# Patient Record
Sex: MALE | Race: ASIAN | NOT HISPANIC OR LATINO | URBAN - METROPOLITAN AREA
[De-identification: names, ages, dates, MRNs, and addresses within clinical notes are randomized per-mention and may not be internally consistent; named-entity substitution may affect disease eponyms.]

---

## 2022-12-30 PROBLEM — Z00.00 ENCOUNTER FOR ANNUAL PHYSICAL EXAM: Status: ACTIVE | Noted: 2022-12-30

## 2024-03-14 ENCOUNTER — OFFICE VISIT (OUTPATIENT)
Dept: FAMILY MEDICINE CLINIC | Facility: CLINIC | Age: 39
End: 2024-03-14
Payer: COMMERCIAL

## 2024-03-14 VITALS
WEIGHT: 183 LBS | BODY MASS INDEX: 26.2 KG/M2 | SYSTOLIC BLOOD PRESSURE: 118 MMHG | HEART RATE: 75 BPM | DIASTOLIC BLOOD PRESSURE: 76 MMHG | OXYGEN SATURATION: 97 % | TEMPERATURE: 97.4 F | HEIGHT: 70 IN

## 2024-03-14 DIAGNOSIS — M25.561 CHRONIC PAIN OF RIGHT KNEE: Primary | ICD-10-CM

## 2024-03-14 DIAGNOSIS — G89.29 CHRONIC PAIN OF RIGHT KNEE: Primary | ICD-10-CM

## 2024-03-14 PROCEDURE — 99213 OFFICE O/P EST LOW 20 MIN: CPT | Performed by: FAMILY MEDICINE

## 2024-03-14 NOTE — PROGRESS NOTES
Depression Screening and Follow-up Plan: Patient's depression screening was positive with a PHQ-2 score of 3. Their PHQ-9 score was 5. Patient assessed for underlying major depression. Brief counseling provided and recommend additional follow-up/re-evaluation next office visit.     Assessment/Plan:         Problem List Items Addressed This Visit        Surgery/Wound/Pain    Chronic pain of right knee - Primary     Patient has had it for past year and not getting better. Started after squatting with weights. Was swollen at first. Now, it hurts to extend leg and to run or go down stairs. Patient has tenderness to palpation right lateral knee and likely has meniscus injury. Will refer to Orthopedics. Patient to take advil as needed and to avoid excessive strain on knee. Patient to wear knee support and will likely need physical therapy.          Relevant Orders    Ambulatory Referral to Orthopedic Surgery         Subjective:      Patient ID: Rossy Driver is a 38 y.o. male.    Patient here for right knee pain off and on for past 1 year. Started after doing squats at home. Was swollen at first. Gets worse when runs. Feels pressure around patella when kneels. No giving out. No pain with walking. Hurts going down stairs. No previous injuries to knee. Not getting better. Not taking any medications or using brace.     Knee Pain         The following portions of the patient's history were reviewed and updated as appropriate:   Past Medical History:  He has a past medical history of Allergic.,  _______________________________________________________________________  Medical Problems:  does not have any pertinent problems on file.,  _______________________________________________________________________  Past Surgical History:   has no past surgical history on file.,  _______________________________________________________________________  Family History:  family history includes Heart disease in his father; Hyperlipidemia  "in his father and mother; Hypertension in his father and mother.,  _______________________________________________________________________  Social History:   reports that he has never smoked. He has never used smokeless tobacco. He reports that he does not drink alcohol and does not use drugs.,  _______________________________________________________________________  Allergies:  has No Known Allergies..  _______________________________________________________________________  No current outpatient medications on file.     No current facility-administered medications for this visit.     _______________________________________________________________________  Review of Systems   Constitutional:  Negative for fatigue and unexpected weight change.   Respiratory:  Negative for cough and shortness of breath.    Cardiovascular:  Negative for chest pain.   Gastrointestinal:  Negative for abdominal pain, constipation, diarrhea and vomiting.   Musculoskeletal:  Positive for arthralgias.   Neurological:  Negative for dizziness and headaches.   Psychiatric/Behavioral:  Negative for dysphoric mood. The patient is not nervous/anxious.          Objective:  Vitals:    03/14/24 0936 03/14/24 0937   BP:  118/76   BP Location:  Left arm   Patient Position:  Sitting   Cuff Size:  Standard   Pulse:  75   Temp:  (!) 97.4 °F (36.3 °C)   TempSrc:  Tympanic   SpO2:  97%   Weight: 82.6 kg (182 lb) 83 kg (183 lb)   Height:  5' 10\" (1.778 m)     Body mass index is 26.26 kg/m².     Physical Exam  Vitals and nursing note reviewed.   Constitutional:       Appearance: Normal appearance. He is well-developed and normal weight.   Neck:      Thyroid: No thyromegaly.   Cardiovascular:      Rate and Rhythm: Normal rate and regular rhythm.      Heart sounds: Normal heart sounds. No murmur heard.  Pulmonary:      Effort: Pulmonary effort is normal. No respiratory distress.      Breath sounds: Normal breath sounds. No wheezing.   Musculoskeletal:      " Cervical back: Normal range of motion and neck supple.      Right lower leg: No edema.      Left lower leg: No edema.      Comments: Tenderness to palpation lateral to right patella and pain with leg extension, No effusions today   Lymphadenopathy:      Cervical: No cervical adenopathy.   Neurological:      Mental Status: He is alert and oriented to person, place, and time.      Cranial Nerves: No cranial nerve deficit.   Psychiatric:         Mood and Affect: Mood normal.         Behavior: Behavior normal.         Thought Content: Thought content normal.         Judgment: Judgment normal.

## 2024-03-14 NOTE — ASSESSMENT & PLAN NOTE
Patient has had it for past year and not getting better. Started after squatting with weights. Was swollen at first. Now, it hurts to extend leg and to run or go down stairs. Patient has tenderness to palpation right lateral knee and likely has meniscus injury. Will refer to Orthopedics. Patient to take advil as needed and to avoid excessive strain on knee. Patient to wear knee support and will likely need physical therapy.

## 2024-12-03 ENCOUNTER — APPOINTMENT (EMERGENCY)
Dept: CT IMAGING | Facility: HOSPITAL | Age: 39
End: 2024-12-03
Payer: COMMERCIAL

## 2024-12-03 ENCOUNTER — HOSPITAL ENCOUNTER (OUTPATIENT)
Facility: HOSPITAL | Age: 39
Setting detail: OBSERVATION
Discharge: HOME | End: 2024-12-05
Attending: EMERGENCY MEDICINE | Admitting: INTERNAL MEDICINE
Payer: COMMERCIAL

## 2024-12-03 ENCOUNTER — HOSPITAL ENCOUNTER (EMERGENCY)
Facility: HOSPITAL | Age: 39
Discharge: STILL ADMITTED OR ADMINISTRATIVE | End: 2024-12-03
Attending: EMERGENCY MEDICINE
Payer: COMMERCIAL

## 2024-12-03 VITALS
OXYGEN SATURATION: 98 % | TEMPERATURE: 99.3 F | DIASTOLIC BLOOD PRESSURE: 76 MMHG | RESPIRATION RATE: 14 BRPM | HEART RATE: 72 BPM | SYSTOLIC BLOOD PRESSURE: 118 MMHG

## 2024-12-03 DIAGNOSIS — J36 PERITONSILLAR ABSCESS: Primary | ICD-10-CM

## 2024-12-03 DIAGNOSIS — J03.90 TONSILLITIS: ICD-10-CM

## 2024-12-03 LAB
ALBUMIN SERPL BCG-MCNC: 4 G/DL (ref 3.5–5)
ALP SERPL-CCNC: 58 U/L (ref 34–104)
ALT SERPL W P-5'-P-CCNC: 36 U/L (ref 7–52)
ANION GAP SERPL CALCULATED.3IONS-SCNC: 9 MMOL/L (ref 4–13)
AST SERPL W P-5'-P-CCNC: 17 U/L (ref 13–39)
BASOPHILS # BLD AUTO: 0.07 THOUSANDS/ÂΜL (ref 0–0.1)
BASOPHILS NFR BLD AUTO: 0 % (ref 0–1)
BILIRUB SERPL-MCNC: 0.63 MG/DL (ref 0.2–1)
BUN SERPL-MCNC: 15 MG/DL (ref 5–25)
CALCIUM SERPL-MCNC: 9.4 MG/DL (ref 8.4–10.2)
CHLORIDE SERPL-SCNC: 101 MMOL/L (ref 96–108)
CO2 SERPL-SCNC: 25 MMOL/L (ref 21–32)
CREAT SERPL-MCNC: 0.85 MG/DL (ref 0.6–1.3)
EOSINOPHIL # BLD AUTO: 0.04 THOUSAND/ÂΜL (ref 0–0.61)
EOSINOPHIL NFR BLD AUTO: 0 % (ref 0–6)
ERYTHROCYTE [DISTWIDTH] IN BLOOD BY AUTOMATED COUNT: 12.2 % (ref 11.6–15.1)
GFR SERPL CREATININE-BSD FRML MDRD: 108 ML/MIN/1.73SQ M
GLUCOSE SERPL-MCNC: 101 MG/DL (ref 65–140)
HCT VFR BLD AUTO: 42.3 % (ref 36.5–49.3)
HGB BLD-MCNC: 14.1 G/DL (ref 12–17)
IMM GRANULOCYTES # BLD AUTO: 0.29 THOUSAND/UL (ref 0–0.2)
IMM GRANULOCYTES NFR BLD AUTO: 2 % (ref 0–2)
LYMPHOCYTES # BLD AUTO: 2.82 THOUSANDS/ÂΜL (ref 0.6–4.47)
LYMPHOCYTES NFR BLD AUTO: 18 % (ref 14–44)
MAGNESIUM SERPL-MCNC: 2.2 MG/DL (ref 1.9–2.7)
MCH RBC QN AUTO: 28 PG (ref 26.8–34.3)
MCHC RBC AUTO-ENTMCNC: 33.3 G/DL (ref 31.4–37.4)
MCV RBC AUTO: 84 FL (ref 82–98)
MONOCYTES # BLD AUTO: 1.35 THOUSAND/ÂΜL (ref 0.17–1.22)
MONOCYTES NFR BLD AUTO: 9 % (ref 4–12)
NEUTROPHILS # BLD AUTO: 11.4 THOUSANDS/ÂΜL (ref 1.85–7.62)
NEUTS SEG NFR BLD AUTO: 71 % (ref 43–75)
NRBC BLD AUTO-RTO: 0 /100 WBCS
PLATELET # BLD AUTO: 499 THOUSANDS/UL (ref 149–390)
PMV BLD AUTO: 8.8 FL (ref 8.9–12.7)
POTASSIUM SERPL-SCNC: 3.6 MMOL/L (ref 3.5–5.3)
PROT SERPL-MCNC: 7.7 G/DL (ref 6.4–8.4)
RBC # BLD AUTO: 5.03 MILLION/UL (ref 3.88–5.62)
SODIUM SERPL-SCNC: 135 MMOL/L (ref 135–147)
WBC # BLD AUTO: 15.97 THOUSAND/UL (ref 4.31–10.16)

## 2024-12-03 PROCEDURE — 99285 EMERGENCY DEPT VISIT HI MDM: CPT | Performed by: EMERGENCY MEDICINE

## 2024-12-03 PROCEDURE — 42700 I&D ABSCESS PERITONSILLAR: CPT | Performed by: OTOLARYNGOLOGY

## 2024-12-03 PROCEDURE — 96375 TX/PRO/DX INJ NEW DRUG ADDON: CPT

## 2024-12-03 PROCEDURE — 87040 BLOOD CULTURE FOR BACTERIA: CPT | Performed by: EMERGENCY MEDICINE

## 2024-12-03 PROCEDURE — 99282 EMERGENCY DEPT VISIT SF MDM: CPT | Performed by: OTOLARYNGOLOGY

## 2024-12-03 PROCEDURE — 83735 ASSAY OF MAGNESIUM: CPT | Performed by: EMERGENCY MEDICINE

## 2024-12-03 PROCEDURE — 99283 EMERGENCY DEPT VISIT LOW MDM: CPT

## 2024-12-03 PROCEDURE — 80053 COMPREHEN METABOLIC PANEL: CPT | Performed by: EMERGENCY MEDICINE

## 2024-12-03 PROCEDURE — 36415 COLL VENOUS BLD VENIPUNCTURE: CPT | Performed by: EMERGENCY MEDICINE

## 2024-12-03 PROCEDURE — 96365 THER/PROPH/DIAG IV INF INIT: CPT

## 2024-12-03 PROCEDURE — 87185 SC STD ENZYME DETCJ PER NZM: CPT

## 2024-12-03 PROCEDURE — 85025 COMPLETE CBC W/AUTO DIFF WBC: CPT | Performed by: EMERGENCY MEDICINE

## 2024-12-03 PROCEDURE — 87070 CULTURE OTHR SPECIMN AEROBIC: CPT

## 2024-12-03 PROCEDURE — 70491 CT SOFT TISSUE NECK W/DYE: CPT

## 2024-12-03 PROCEDURE — 87076 CULTURE ANAEROBE IDENT EACH: CPT

## 2024-12-03 PROCEDURE — 87075 CULTR BACTERIA EXCEPT BLOOD: CPT

## 2024-12-03 PROCEDURE — 96374 THER/PROPH/DIAG INJ IV PUSH: CPT

## 2024-12-03 PROCEDURE — 87077 CULTURE AEROBIC IDENTIFY: CPT

## 2024-12-03 PROCEDURE — 99223 1ST HOSP IP/OBS HIGH 75: CPT | Performed by: INTERNAL MEDICINE

## 2024-12-03 PROCEDURE — 99284 EMERGENCY DEPT VISIT MOD MDM: CPT

## 2024-12-03 PROCEDURE — 87205 SMEAR GRAM STAIN: CPT

## 2024-12-03 RX ORDER — KETOROLAC TROMETHAMINE 30 MG/ML
15 INJECTION, SOLUTION INTRAMUSCULAR; INTRAVENOUS ONCE
Status: COMPLETED | OUTPATIENT
Start: 2024-12-03 | End: 2024-12-03

## 2024-12-03 RX ORDER — CLINDAMYCIN PHOSPHATE 600 MG/50ML
600 INJECTION, SOLUTION INTRAVENOUS EVERY 8 HOURS
Status: DISCONTINUED | OUTPATIENT
Start: 2024-12-03 | End: 2024-12-05 | Stop reason: HOSPADM

## 2024-12-03 RX ORDER — KETOROLAC TROMETHAMINE 30 MG/ML
15 INJECTION, SOLUTION INTRAMUSCULAR; INTRAVENOUS ONCE
Status: DISCONTINUED | OUTPATIENT
Start: 2024-12-03 | End: 2024-12-03 | Stop reason: HOSPADM

## 2024-12-03 RX ORDER — HYDROMORPHONE HCL IN WATER/PF 6 MG/30 ML
0.2 PATIENT CONTROLLED ANALGESIA SYRINGE INTRAVENOUS ONCE
Status: COMPLETED | OUTPATIENT
Start: 2024-12-03 | End: 2024-12-03

## 2024-12-03 RX ORDER — ENOXAPARIN SODIUM 100 MG/ML
40 INJECTION SUBCUTANEOUS DAILY
Status: DISCONTINUED | OUTPATIENT
Start: 2024-12-03 | End: 2024-12-05 | Stop reason: HOSPADM

## 2024-12-03 RX ORDER — LIDOCAINE HYDROCHLORIDE AND EPINEPHRINE BITARTRATE 20; .01 MG/ML; MG/ML
1 INJECTION, SOLUTION SUBCUTANEOUS ONCE
Status: COMPLETED | OUTPATIENT
Start: 2024-12-03 | End: 2024-12-03

## 2024-12-03 RX ORDER — KETOROLAC TROMETHAMINE 30 MG/ML
15 INJECTION, SOLUTION INTRAMUSCULAR; INTRAVENOUS EVERY 6 HOURS PRN
Status: ACTIVE | OUTPATIENT
Start: 2024-12-03 | End: 2024-12-05

## 2024-12-03 RX ORDER — ACETAMINOPHEN 160 MG/5ML
650 SUSPENSION ORAL ONCE
Status: DISCONTINUED | OUTPATIENT
Start: 2024-12-03 | End: 2024-12-03

## 2024-12-03 RX ORDER — AMPICILLIN AND SULBACTAM 1; .5 G/1; G/1
INJECTION, POWDER, FOR SOLUTION INTRAMUSCULAR; INTRAVENOUS
Status: COMPLETED
Start: 2024-12-03 | End: 2024-12-03

## 2024-12-03 RX ORDER — WATER 10 ML/10ML
INJECTION INTRAMUSCULAR; INTRAVENOUS; SUBCUTANEOUS
Status: COMPLETED
Start: 2024-12-03 | End: 2024-12-03

## 2024-12-03 RX ORDER — DEXAMETHASONE SODIUM PHOSPHATE 10 MG/ML
10 INJECTION, SOLUTION INTRAMUSCULAR; INTRAVENOUS ONCE
Status: COMPLETED | OUTPATIENT
Start: 2024-12-03 | End: 2024-12-03

## 2024-12-03 RX ORDER — ACETAMINOPHEN 325 MG/1
650 TABLET ORAL EVERY 6 HOURS PRN
Status: DISCONTINUED | OUTPATIENT
Start: 2024-12-03 | End: 2024-12-05 | Stop reason: HOSPADM

## 2024-12-03 RX ADMIN — KETOROLAC TROMETHAMINE 15 MG: 30 INJECTION, SOLUTION INTRAMUSCULAR at 01:57

## 2024-12-03 RX ADMIN — CLINDAMYCIN PHOSPHATE 600 MG: 600 INJECTION, SOLUTION INTRAVENOUS at 18:09

## 2024-12-03 RX ADMIN — SODIUM CHLORIDE 3 G: 9 INJECTION, SOLUTION INTRAVENOUS at 02:19

## 2024-12-03 RX ADMIN — HYDROMORPHONE HYDROCHLORIDE 0.2 MG: 0.2 INJECTION, SOLUTION INTRAMUSCULAR; INTRAVENOUS; SUBCUTANEOUS at 08:17

## 2024-12-03 RX ADMIN — DEXAMETHASONE SODIUM PHOSPHATE 10 MG: 10 INJECTION, SOLUTION INTRAMUSCULAR; INTRAVENOUS at 01:57

## 2024-12-03 RX ADMIN — CLINDAMYCIN PHOSPHATE 600 MG: 600 INJECTION, SOLUTION INTRAVENOUS at 11:28

## 2024-12-03 RX ADMIN — SODIUM CHLORIDE 1000 ML: 0.9 INJECTION, SOLUTION INTRAVENOUS at 01:57

## 2024-12-03 RX ADMIN — IOHEXOL 100 ML: 350 INJECTION, SOLUTION INTRAVENOUS at 03:04

## 2024-12-03 RX ADMIN — TOPICAL ANESTHETIC: 200 SPRAY DENTAL; PERIODONTAL at 08:36

## 2024-12-03 RX ADMIN — LIDOCAINE HYDROCHLORIDE,EPINEPHRINE BITARTRATE 1 ML: 20; .01 INJECTION, SOLUTION INFILTRATION; PERINEURAL at 08:36

## 2024-12-03 NOTE — ED PROVIDER NOTES
Time reflects when diagnosis was documented in both MDM as applicable and the Disposition within this note       Time User Action Codes Description Comment    12/3/2024  4:32 AM Rick Elam [[email or web address]] Peritonsillar abscess     12/3/2024  4:32 AM Rick Elam [J03.90] Tonsillitis     12/3/2024  4:32 AM Tucker Charles Add [[email or web address]] Tonsillar abscess     12/3/2024  4:32 AM Tucker Charles Remove [[email or web address]] Tonsillar abscess           ED Disposition       ED Disposition   Transfer to Another Facility-In Network    Condition   --    Date/Time   Tue Dec 3, 2024  4:32 AM    Comment   Rossy Driver should be transferred out to [facility].               Assessment & Plan       Medical Decision Making  Patient is a 39-year-old male who presented to the ED for sore throat.  Patient stated that he initially started having sore throat on 11/24/2024 and was found to have strep and placed on amoxicillin.  Patient's symptoms worsened and he developed symptoms of space-occupying lesion with some difficulty with feeding as well as feeling of tightness in the throat and muffled voice.  At that point he presented to [place] and was admitted for peritonsillar abscess and tonsillitis.  CT soft tissue neck showed these findings.  He was placed on Unasyn and was seen by ENT the next day who stated that there was improvement in that he was okay to discharge home on Augmentin and steroids.  Patient continued to take these as prescribed and currently is finishing up the Augmentin and steroids.  Today, he presented for worsening sore throat as well as worsening subjective feeling of throat tightness with some subjective dyspnea.  He is saturating well on room air.  No increased work of breathing or stridor.  Is able to tolerate p.o. intake but states that solid food is very difficult for him to eat.  On evaluation, patient appeared very uncomfortable and had significantly muffled voice.    Ddx includes but is not limited to tonsillitis,  peritonsillar abscess.  CBC and CMP grossly unremarkable with exception of WBC 15.97. CT soft tissue neck revealed likely PTA measuring 2.9 x 3.3 x 5.5 cm, notably larger than previous recent study.  Patient was given Toradol and Decadron and stated that he felt much better afterwards.  Unasyn was initiated empirically.  ENT was contacted and given the mass effect from the abscess as well as the failure of current medical therapy, they recommended I&D at [place] and then admission to medicine for continued IV antibiotics.  Patient was counseled on this and accepted the plan. Remained hemodynamically stable and without signs of impending airway compromise. Patient accepted at [facility], currently pending transfer at 0730.      Amount and/or Complexity of Data Reviewed  Labs: ordered. Decision-making details documented in ED Course.  Radiology: ordered.    Risk  Prescription drug management.        ED Course as of 12/03/24 0716   Tue Dec 03, 2024   0219 WBC(!): 15.97       Medications   ketorolac (TORADOL) injection 15 mg (has no administration in time range)   sodium chloride 0.9 % bolus 1,000 mL (0 mL Intravenous Stopped 12/3/24 0329)   ketorolac (TORADOL) injection 15 mg (15 mg Intravenous Given 12/3/24 0157)   dexamethasone (PF) (DECADRON) injection 10 mg (10 mg Intravenous Given 12/3/24 0157)   ampicillin-sulbactam (UNASYN) 3 g in sodium chloride 0.9 % 100 mL IVPB (0 g Intravenous Stopped 12/3/24 0329)   ampicillin-sulbactam (UNASYN) 1.5 g injection **ADS Override Pull** (  Override Pull 12/3/24 0215)   sterile water injection **ADS Override Pull** (  Override Pull 12/3/24 0217)   iohexol (OMNIPAQUE) 350 MG/ML injection (SINGLE-DOSE) 100 mL (100 mL Intravenous Given 12/3/24 0304)       ED Risk Strat Scores                                               History of Present Illness       Chief Complaint   Patient presents with    Sore Throat - Complicated     Patient arrives to the ER from home with complaints of sore  throat and inability to eat. Pt recently placed on antibiotics and was seen for similar symptoms. Pt reports he had a CT scan completed and he was told he needed surgery. Pt has been taking tylenol and motrin at home. Recent diagnosis of peritonsillar abscess.       Past Medical History:   Diagnosis Date    Allergic       History reviewed. No pertinent surgical history.   Family History   Problem Relation Age of Onset    Hypertension Mother     Hyperlipidemia Mother     Hypertension Father     Heart disease Father     Hyperlipidemia Father       Social History     Tobacco Use    Smoking status: Never    Smokeless tobacco: Never   Vaping Use    Vaping status: Never Used   Substance Use Topics    Alcohol use: Never    Drug use: Never      E-Cigarette/Vaping    E-Cigarette Use Never User       E-Cigarette/Vaping Substances    Nicotine No     THC No     CBD No     Flavoring No     Other No     Unknown No       I have reviewed and agree with the history as documented.     Patient is a 39-year-old male who presented to the ED for sore throat.  Patient stated that he initially started having sore throat on 11/24/2024 and was found to have strep and placed on amoxicillin.  Patient's symptoms worsened and he developed symptoms of space-occupying lesion with some difficulty with feeding as well as feeling of tightness in the throat and muffled voice.  At that point he presented to [place] and was admitted for peritonsillar abscess and tonsillitis.  CT soft tissue neck showed these findings.  He was placed on Unasyn and was seen by ENT the next day who stated that there was improvement in that he was okay to discharge home on Augmentin and steroids.  Patient continued to take these as prescribed and currently is finishing up the Augmentin and steroids.  Today, he presented for worsening sore throat as well as worsening subjective feeling of throat tightness with some subjective dyspnea.  He is saturating well on room air.   No increased work of breathing or stridor.  Is able to tolerate p.o. intake but states that solid food is very difficult for him to eat.  On evaluation, patient appeared very uncomfortable and had significantly muffled voice.        Review of Systems   Constitutional:  Positive for fatigue.   HENT:  Positive for trouble swallowing and voice change.    Respiratory:  Negative for stridor.    Cardiovascular:  Negative for chest pain and palpitations.   Gastrointestinal:  Negative for abdominal pain, nausea and vomiting.   Genitourinary:  Negative for dysuria, frequency and urgency.   Skin:  Negative for color change and pallor.   Neurological:  Negative for dizziness and weakness.   Psychiatric/Behavioral: Negative.             Objective       ED Triage Vitals [12/03/24 0131]   Temperature Pulse Blood Pressure Respirations SpO2 Patient Position - Orthostatic VS   99.3 °F (37.4 °C) 88 124/83 18 98 % Lying      Temp Source Heart Rate Source BP Location FiO2 (%) Pain Score    Oral Monitor Right arm -- --      Vitals      Date and Time Temp Pulse SpO2 Resp BP Pain Score FACES Pain Rating User   12/03/24 0700 -- 72 98 % 14 -- -- -- KB   12/03/24 0615 -- 73 98 % 20 -- -- --    12/03/24 0600 -- 66 98 % 18 118/76 -- --    12/03/24 0545 -- 89 98 % 20 124/75 -- --    12/03/24 0515 -- 85 98 % 20 -- -- --    12/03/24 0300 -- 88 100 % 20 126/80 -- --    12/03/24 0245 -- 78 100 % 20 -- -- --    12/03/24 0230 -- 79 99 % 17 121/72 -- --    12/03/24 0145 -- 85 98 % 20 135/96 -- --    12/03/24 0131 99.3 °F (37.4 °C) 88 98 % 18 124/83 -- -- JF            Physical Exam  On examination:  The patient is awake, alert and oriented  HEENT: Normocephalic/atraumatic  External examination of the ears is unremarkable  Pupils are equal round and reactive to light, there is no conjunctival injection or scleral icterus noted  Nares are patent without rhinorrhea.  Oropharynx with peritonsillar abscess slightly right of midline,  surrounding erythema diffusely throughout oropharynx.  The neck is supple  Lungs: Clear to auscultation bilaterally  Heart: Regular without murmurs rubs or gallops  Abdomen: Soft and nontender. There are positive bowel sounds. there is no rebound or guarding  Musculoskeletal: Normal range of motion with grossly normal strength  Neuro: Cranial nerves II through XII grossly intact. Nonfocal exam  Skin: No rash noted  Psych: Mood and affect normal      Results Reviewed       Procedure Component Value Units Date/Time    Comprehensive metabolic panel [[id number]] Collected: 12/03/24 0147    Lab Status: Final result Specimen: Blood from Arm, Left Updated: 12/03/24 0234     Sodium 135 mmol/L      Potassium 3.6 mmol/L      Chloride 101 mmol/L      CO2 25 mmol/L      ANION GAP 9 mmol/L      BUN 15 mg/dL      Creatinine 0.85 mg/dL      Glucose 101 mg/dL      Calcium 9.4 mg/dL      AST 17 U/L      ALT 36 U/L      Alkaline Phosphatase 58 U/L      Total Protein 7.7 g/dL      Albumin 4.0 g/dL      Total Bilirubin 0.63 mg/dL      eGFR 108 ml/min/1.73sq m     Narrative:      National Kidney Disease Foundation guidelines for Chronic Kidney Disease (CKD):     Stage 1 with normal or high GFR (GFR > 90 mL/min/1.73 square meters)    Stage 2 Mild CKD (GFR = 60-89 mL/min/1.73 square meters)    Stage 3A Moderate CKD (GFR = 45-59 mL/min/1.73 square meters)    Stage 3B Moderate CKD (GFR = 30-44 mL/min/1.73 square meters)    Stage 4 Severe CKD (GFR = 15-29 mL/min/1.73 square meters)    Stage 5 End Stage CKD (GFR <15 mL/min/1.73 square meters)  Note: GFR calculation is accurate only with a steady state creatinine    Magnesium [[id number]]  (Normal) Collected: 12/03/24 0147    Lab Status: Final result Specimen: Blood from Arm, Left Updated: 12/03/24 0233     Magnesium 2.2 mg/dL     CBC and differential [[id number]]  (Abnormal) Collected: 12/03/24 0147    Lab Status: Final result Specimen: Blood from Arm, Left Updated: 12/03/24 0216     WBC 15.97  Thousand/uL      RBC 5.03 Million/uL      Hemoglobin 14.1 g/dL      Hematocrit 42.3 %      MCV 84 fL      MCH 28.0 pg      MCHC 33.3 g/dL      RDW 12.2 %      MPV 8.8 fL      Platelets 499 Thousands/uL      nRBC 0 /100 WBCs      Segmented % 71 %      Immature Grans % 2 %      Lymphocytes % 18 %      Monocytes % 9 %      Eosinophils Relative 0 %      Basophils Relative 0 %      Absolute Neutrophils 11.40 Thousands/µL      Absolute Immature Grans 0.29 Thousand/uL      Absolute Lymphocytes 2.82 Thousands/µL      Absolute Monocytes 1.35 Thousand/µL      Eosinophils Absolute 0.04 Thousand/µL      Basophils Absolute 0.07 Thousands/µL     Blood culture #2 [[id number]] Collected: 12/03/24 0147    Lab Status: In process Specimen: Blood from Arm, Left Updated: 12/03/24 0214    Blood culture #1 [[id number]] Collected: 12/03/24 0204    Lab Status: In process Specimen: Blood from Arm, Left Updated: 12/03/24 0214            CT soft tissue neck with contrast   Final Interpretation by Omi Power DO (12/03 0344)      Prominence of the right tonsillar pillar with a heterogeneous rim-enhancing fluid collection in the right tonsillar region measuring approximately 2.9 x 3.3 x 5.5 cm in size with associated narrowing of the pharynx in this region; findings taken together    are highly suspicious for right tonsillar abscess.      Other findings as above.      The study was marked in EPIC for immediate notification.      Workstation performed: [id number]             Procedures    ED Medication and Procedure Management   None     Patient's Medications    No medications on file     No discharge procedures on file.  ED SEPSIS DOCUMENTATION   Time reflects when diagnosis was documented in both MDM as applicable and the Disposition within this note       Time User Action Codes Description Comment    12/3/2024  4:32 AM Rick Elam [[email or web address]] Peritonsillar abscess     12/3/2024  4:32 AM Rick Elam [J03.90] Tonsillitis      12/3/2024  4:32 AM Tucker Charles Add [[email or web address]] Tonsillar abscess     12/3/2024  4:32 AM Tucker Charles Remove [[email or web address]] Tonsillar abscess                  Rick Elam MD  12/03/24 0716

## 2024-12-03 NOTE — ASSESSMENT & PLAN NOTE
Status post bedside incision and drainage done by ENT, in the ER, 12/3.  ENT doctor on board.  Continue IV clindamycin that was started here in the emergency room.  Follow-up results of the cultures.  Adjust treatment accordingly.  Pain management.  Watch out for any signs and symptoms of airway compromise and/or swallowing difficulties.  Surgical soft diet for now.  Advance diet as tolerated.

## 2024-12-03 NOTE — H&P
H&P - Hospitalist   Name: Rossy Driver 39 y.o. male I MRN: [id number]  Unit/Bed#: ED-09 I Date of Admission: 12/3/2024   Date of Service: 12/3/2024 I Hospital Day: 0     Assessment & Plan  Peritonsillar abscess  Status post bedside incision and drainage done by ENT, in the ER, 12/3.  ENT doctor on board.  Continue IV clindamycin that was started here in the emergency room.  Follow-up results of the cultures.  Adjust treatment accordingly.  Pain management.  Watch out for any signs and symptoms of airway compromise and/or swallowing difficulties.  Surgical soft diet for now.  Advance diet as tolerated.      VTE Pharmacologic Prophylaxis: VTE Score: 3 Moderate Risk (Score 3-4) - Pharmacological DVT Prophylaxis Ordered: enoxaparin (Lovenox).  Code Status: Level 1 - Full Code   Discussion with family: Patient declined call to .     Anticipated Length of Stay: Patient will be admitted on an observation basis with an anticipated length of stay of less than 2 midnights secondary to above findings and plans.    History of Present Illness   Chief Complaint: Sore throat    Rossy Driver is a 39 y.o. male with who presents with sore throat.  According to the patient, he started having sore throat approximately a week ago.  Last Wednesday, or 6 days ago, patient was diagnosed with strep throat and had tonsillar abscess and was admitted for a day at [facility].  Patient was given IV Unasyn and was eventually discharged on Augmentin.  Patient took the Augmentin, however, no relief of his symptoms.  Patient continued to having sore throat.  Patient 6 days ago also had fever and chills, as well as ear pains, but these had resolved.  Patient also had some shortness of breath this past few days, but none anymore at present.  Patient had pains on swallowing, but when I saw him, it has been getting better.  Patient denied any other symptoms other than ones mentioned above.  The emergency room, patient  was found to have tonsillar abscess, and ENT did a bedside I&D.  Patient was given a dose of IV clindamycin.    Review of Systems    10 point review systems done and they were negative except for the ones I mentioned in my history of present illness.  Patient denied any headaches or any dizziness or any loss of consciousness.  Patient denied any chest pains.  Patient denied any nausea or vomiting.  Patient denied any abdominal pains.  Patient denied any cough cough or colds.  Patient denied any urinary or any bowel movement problems.      Historical Information   Past Medical History:   Diagnosis Date    Allergic      History reviewed. No pertinent surgical history.  Social History     Tobacco Use    Smoking status: Never    Smokeless tobacco: Never   Vaping Use    Vaping status: Never Used   Substance and Sexual Activity    Alcohol use: Never    Drug use: Never    Sexual activity: Yes     Partners: Female     E-Cigarette/Vaping    E-Cigarette Use Never User      E-Cigarette/Vaping Substances    Nicotine No     THC No     CBD No     Flavoring No     Other No     Unknown No        Social History:  Marital Status: /Civil Union   Patient Pre-hospital Living Situation: Home  Patient Pre-hospital Level of Mobility: walks    Meds/Allergies   I have reviewed home medications using recent Epic encounter.  Not on any maintenance medications.  Prior to Admission medications    Not on File     No Known Allergies    Objective :  Temp:  [98 °F (36.7 °C)-99.3 °F (37.4 °C)] 98 °F (36.7 °C)  HR:  [66-89] 76  BP: (118-135)/(72-96) 126/81  Resp:  [14-20] 18  SpO2:  [96 %-100 %] 96 %  O2 Device: None (Room air)    Physical Exam  Vitals and nursing note reviewed.   Constitutional:       General: He is not in acute distress.     Appearance: He is not ill-appearing, toxic-appearing or diaphoretic.   HENT:      Mouth/Throat:      Mouth: Mucous membranes are moist.      Pharynx: Oropharyngeal exudate and posterior oropharyngeal  "erythema present.      Comments: Positive for tonsillar enlargement, with erythema (right greater than left); exudate noted on the right tonsillar pharyngeal area.  Patent pharynx/airway.  Eyes:      General: No scleral icterus.        Right eye: No discharge.         Left eye: No discharge.      Conjunctiva/sclera: Conjunctivae normal.   Cardiovascular:      Rate and Rhythm: Normal rate and regular rhythm.      Heart sounds: Normal heart sounds.   Pulmonary:      Effort: Pulmonary effort is normal. No respiratory distress.      Breath sounds: Normal breath sounds.   Abdominal:      General: Bowel sounds are normal. There is no distension.      Palpations: Abdomen is soft.      Tenderness: There is no abdominal tenderness.   Musculoskeletal:      Right lower leg: No edema.      Left lower leg: No edema.   Skin:     General: Skin is warm.      Coloration: Skin is not pale.      Findings: No erythema or rash.   Neurological:      General: No focal deficit present.      Mental Status: He is alert and oriented to person, place, and time.   Psychiatric:         Mood and Affect: Mood normal.         Behavior: Behavior normal.         Thought Content: Thought content normal.            Lines/Drains:            Lab Results: I have reviewed the following results:  Results from last 7 days   Lab Units 12/03/24  0147   WBC Thousand/uL 15.97*   HEMOGLOBIN g/dL 14.1   HEMATOCRIT % 42.3   PLATELETS Thousands/uL 499*   SEGS PCT % 71   LYMPHO PCT % 18   MONO PCT % 9   EOS PCT % 0     Results from last 7 days   Lab Units 12/03/24  0147   SODIUM mmol/L 135   POTASSIUM mmol/L 3.6   CHLORIDE mmol/L 101   CO2 mmol/L 25   BUN mg/dL 15   CREATININE mg/dL 0.85   ANION GAP mmol/L 9   CALCIUM mg/dL 9.4   ALBUMIN g/dL 4.0   TOTAL BILIRUBIN mg/dL 0.63   ALK PHOS U/L 58   ALT U/L 36   AST U/L 17   GLUCOSE RANDOM mg/dL 101             No results found for: \"HGBA1C\"        Imaging Results Review: I reviewed radiology reports from this admission " including: CT soft tissues of the neck.  Other Study Results Review: No additional pertinent studies reviewed.    Administrative Statements       ** Please Note: This note has been constructed using a voice recognition system. **

## 2024-12-03 NOTE — EMTALA/ACUTE CARE TRANSFER
[facility] EMERGENCY DEPARTMENT  [address]  [facility] [address]  Dept: [phone]      EMTALA TRANSFER CONSENT    NAME Rossy SHARMA 1985                              MRN [id number]    I have been informed of my rights regarding examination, treatment, and transfer   by Dr. Tucker Charles MD    Benefits: Specialized equipment and/or services available at the receiving facility (Include comment)________________________ (ENT)    Risks: Potential for delay in receiving treatment, Potential deterioration of medical condition, Loss of IV      Consent for Transfer:  I acknowledge that my medical condition has been evaluated and explained to me by the emergency department physician or other qualified medical person and/or my attending physician, who has recommended that I be transferred to the service of  Accepting Physician: Dr. Liriano at Accepting Facility Name, City & State : [place], [place]. The above potential benefits of such transfer, the potential risks associated with such transfer, and the probable risks of not being transferred have been explained to me, and I fully understand them.  The doctor has explained that, in my case, the benefits of transfer outweigh the risks.  I agree to be transferred.    I authorize the performance of emergency medical procedures and treatments upon me in both transit and upon arrival at the receiving facility.  Additionally, I authorize the release of any and all medical records to the receiving facility and request they be transported with me, if possible.  I understand that the safest mode of transportation during a medical emergency is an ambulance and that the Hospital advocates the use of this mode of transport. Risks of traveling to the receiving facility by car, including absence of medical control, life sustaining equipment, such as oxygen, and medical personnel has been  explained to me and I fully understand them.    (SOULEYMANE CORRECT BOX BELOW)  [  ]  I consent to the stated transfer and to be transported by ambulance/helicopter.  [  ]  I consent to the stated transfer, but refuse transportation by ambulance and accept full responsibility for my transportation by car.  I understand the risks of non-ambulance transfers and I exonerate the Hospital and its staff from any deterioration in my condition that results from this refusal.    X___________________________________________    DATE  24  TIME________  Signature of patient or legally responsible individual signing on patient behalf           RELATIONSHIP TO PATIENT_________________________          Provider Certification    NAME Rossy Driver                                         1985                              MRN [id number]    A medical screening exam was performed on the above named patient.  Based on the examination:    Condition Necessitating Transfer The primary encounter diagnosis was Peritonsillar abscess. A diagnosis of Tonsillitis was also pertinent to this visit.    Patient Condition: The patient has been stabilized such that within reasonable medical probability, no material deterioration of the patient condition or the condition of the unborn child(mansoor) is likely to result from the transfer    Reason for Transfer: Level of Care needed not available at this facility    Transfer Requirements: Facility [place], [place]   Space available and qualified personnel available for treatment as acknowledged by    Agreed to accept transfer and to provide appropriate medical treatment as acknowledged by       Dr. Liriano  Appropriate medical records of the examination and treatment of the patient are provided at the time of transfer   STAFF INITIAL WHEN COMPLETED _______  Transfer will be performed by qualified personnel from    and appropriate transfer equipment as required, including the use of  necessary and appropriate life support measures.    Provider Certification: I have examined the patient and explained the following risks and benefits of being transferred/refusing transfer to the patient/family:  General risk, such as traffic hazards, adverse weather conditions, rough terrain or turbulence, possible failure of equipment (including vehicle or aircraft), or consequences of actions of persons outside the control of the transport personnel, Unanticipated needs of medical equipment and personnel during transport, The possibility of a transport vehicle being unavailable, Risk of worsening condition      Based on these reasonable risks and benefits to the patient and/or the unborn child(mansoor), and based upon the information available at the time of the patient’s examination, I certify that the medical benefits reasonably to be expected from the provision of appropriate medical treatments at another medical facility outweigh the increasing risks, if any, to the individual’s medical condition, and in the case of labor to the unborn child, from effecting the transfer.    X____________________________________________ DATE 12/03/24        TIME_______      ORIGINAL - SEND TO MEDICAL RECORDS   COPY - SEND WITH PATIENT DURING TRANSFER

## 2024-12-03 NOTE — ED NOTES
Telephone report given to Jamie MEJÍA RN. Awaiting transport at this time.      Nila Krishnamurthy RN  12/03/24 0732

## 2024-12-03 NOTE — ED ATTENDING ATTESTATION
12/3/2024  Pilo HOLGUIN DO, saw and evaluated the patient. I have discussed the patient with the resident/non-physician practitioner and agree with the resident's/non-physician practitioner's findings, Plan of Care, and MDM as documented in the resident's/non-physician practitioner's note, except where noted. All available labs and Radiology studies were reviewed.  I was present for key portions of any procedure(s) performed by the resident/non-physician practitioner and I was immediately available to provide assistance.       At this point I agree with the current assessment done in the Emergency Department.  I have conducted an independent evaluation of this patient a history and physical is as follows:        1. Peritonsillar abscess            MDM  Number of Diagnoses or Management Options  Diagnosis management comments:       Initial ED assessment:   39-year-old male, large peritonsillar abscess,    Pathology at risk for includes but is not limited to:   Peritonsillar abscess    Initial ED plan:   Consult ENT for drainage        Final ED summary/disposition:   After evaluation and workup in the emergency department,  drainage by ENT at bedside, admitted to internal medicine service for IV antibiotics and continued observation at request of ENT               Time reflects when diagnosis was documented in both MDM as applicable and the Disposition within this note       Time User Action Codes Description Comment    12/3/2024  9:25 AM Pilo Fam Add [J36] Peritonsillar abscess           ED Disposition       ED Disposition   Admit    Condition   Stable    Date/Time   Tue Dec 3, 2024  9:25 AM    Comment   Case was discussed with DR. Bruce and the patient's admission status was agreed to be Admission Status: observation status to the service of Dr. Bruce .               Follow-up Information    None                       Chief Complaint   Patient presents with    Abscess     Pt transferred via EMS from  [facility]- dx with peritonsillar abscess. Pt c/o sore throat x12 days. Started on Augmentin Sunday but c/o worsening pain.                  Very pleasant 39-year-old male, transfer from [facility] for peritonsillar abscess, failed outpatient antibiotics, CT imaging shows peritonsillar abscess they did discuss case with  ENT who wanted patient brought here for incision drainage and admission      Abscess                    Physical Exam  Vitals reviewed.   Constitutional:       General: He is not in acute distress.     Appearance: He is well-developed. He is not diaphoretic.   HENT:      Head: Normocephalic and atraumatic.      Right Ear: External ear normal.      Left Ear: External ear normal.      Nose: Nose normal.      Mouth/Throat:      Comments: Some degree of trismus, large right-sided peritonsillar abscess  Eyes:      General:         Right eye: No discharge.         Left eye: No discharge.      Pupils: Pupils are equal, round, and reactive to light.   Neck:      Trachea: No tracheal deviation.   Cardiovascular:      Rate and Rhythm: Normal rate and regular rhythm.      Heart sounds: Normal heart sounds. No murmur heard.  Pulmonary:      Effort: Pulmonary effort is normal. No respiratory distress.      Breath sounds: Normal breath sounds. No stridor.   Abdominal:      General: There is no distension.      Palpations: Abdomen is soft.      Tenderness: There is no abdominal tenderness. There is no guarding or rebound.   Musculoskeletal:         General: Normal range of motion.      Cervical back: Normal range of motion and neck supple.   Skin:     General: Skin is warm and dry.      Coloration: Skin is not pale.      Findings: No erythema.   Neurological:      General: No focal deficit present.      Mental Status: He is alert and oriented to person, place, and time.                       Medications   HYDROmorphone HCl (DILAUDID) injection 0.2 mg (0.2 mg Intravenous Given 12/3/24 0817)    lidocaine-epinephrine (XYLOCAINE/EPINEPHRINE) 2 %-1:100,000 injection 1 mL (1 mL Infiltration Given by Other 12/3/24 0836)   benzocaine (HURRICAINE) 20 % mucosal spray ( Mucosal Given by Other 12/3/24 0836)             Labs Reviewed - No data to display      No orders to display                  Procedures

## 2024-12-03 NOTE — ED NOTES
"RN called pharmacy regarding IV clindamycin- pharmacist stated med could not be verified due to \"too high of a dose.\" Provider contacted by pharmacist. RN awaiting new dose to be placed      Elzbieta Wooten RN  12/03/24 5325    "

## 2024-12-03 NOTE — CONSULTS
Consultation - [facility]/ENT   Rossy Driver 39 y.o. male MRN: [id number]  Unit/Bed#: ED-09 Encounter: [id number]        Assessment:  Right PTA measuring approximately 2.9 x 3.3 x 5.5 cm previously treated with Unasyn, Augmentin, now s/p bedside drainage in the ED on 12/3    Plan:  - Admit for 24 hours observation  - Continue IV antibiotics  - Cultures collected  - Rest of care per primary team    History of Present Illness   Physician Requesting Consult: Sukhdev Bruce MD  Reason for Consult / Principal Problem: PTA  HPI: Rossy Driver is a 39 y.o. year old male who presents with large, right-sided PTA. On 11/24, he presented outpatient for sore throat and was strep positive and started on amoxicillin. His symptoms continued to worsen, and he presented to OSH, admitted for 1 day Unasyn, d/[place]inee with Augmentin, steroids. Presented to [facility] on 12/3, given Decadron, toradol, Unasyn. Transferred to [facility] for ENT evaluation. The patient reports increased difficulty with PO intake. He states he has not had PTA previously.    Review of systems:  10 Point ROS was performed and negative except as above or otherwise noted in the medical record.    Historical Information   Past Medical History:   Diagnosis Date    Allergic      History reviewed. No pertinent surgical history.  Social History   Social History     Substance and Sexual Activity   Alcohol Use Never     Social History     Substance and Sexual Activity   Drug Use Never     Social History     Tobacco Use   Smoking Status Never   Smokeless Tobacco Never     Family History: Family history non-contributory    Meds/Allergies   all current active meds have been reviewed    No Known Allergies    Objective     Vitals:    12/03/24 0803   BP: 126/81   Pulse: 76   Resp: 18   Temp: 98 °F (36.7 °C)   SpO2: 96%         Physical Exam   Constitutional: Oriented. Well-developed and well-nourished, no apparent distress, non-toxic appearance. Cooperative, able to hear and answer  questions without difficulty.    Voice: Muffled voice quality.  Head: Normocephalic, atraumatic.  No scars, masses or lesions.  Face: Symmetric, no edema  Eyes: Vision grossly intact, extra-ocular movement intact.  Ears: External ears normal.   Oral cavity: Mild trismus. Dentition intact.  Mucosa moist.  Tongue mobile.  Oropharynx: Significant right soft palate swelling and erythema, R palatal effacement. Uvula deviated to the left.   Pulmonary/Chest: Normal effort and rate. No respiratory distress. No stertor or stridor  Neurological: Grossly intact.  Psychiatric: Normal mood and affect.    Procedure  Incision and drainage of peritonsillar abscess    Indications: Right sided peritonsillar abscess    Procedure in detail: After informed verbal consent was obtained from the patient, the oropharynx was anesthetized with a combination of 2 ml of 2% viscous lidocaine and 2 ml of 1% lidocaine with 1:100,000 epinephrine. After adequate time for anesthesia, the right sided peritonsillar abscess was aspirated and sent for culture. The peritonsillar space was opened using a 15 blade and all loculations were opened using Yuni clamp. The patient tolerated the procedure well and was returned to the care of nursing in good condition.     No intake or output data in the 24 hours ending 12/03/24 1046    Invasive Devices       Peripheral Intravenous Line  Duration             Peripheral IV 12/03/24 Right Antecubital <1 day                    Lab Results: I have personally reviewed pertinent lab results.    Imaging Studies: Results Review Statement: I personally reviewed the following image studies in PACS and associated radiology reports: CT neck. My interpretation of the radiology images/reports is: R 5cm PTA.  EKG, Pathology, and Other Studies: Results Review Statement: No pertinent imaging studies reviewed.    Jo Ann Mcgee M.D.  PGY-1  Otolaryngology, Head and Neck Surgery  Please contact ENT Resident role on [facility] secure  chat with any questions or concerns.

## 2024-12-04 LAB
ANION GAP SERPL CALCULATED.3IONS-SCNC: 8 MMOL/L (ref 4–13)
BASOPHILS # BLD AUTO: 0.06 THOUSANDS/ÂΜL (ref 0–0.1)
BASOPHILS NFR BLD AUTO: 1 % (ref 0–1)
BUN SERPL-MCNC: 14 MG/DL (ref 5–25)
CALCIUM SERPL-MCNC: 8.5 MG/DL (ref 8.4–10.2)
CHLORIDE SERPL-SCNC: 102 MMOL/L (ref 96–108)
CO2 SERPL-SCNC: 26 MMOL/L (ref 21–32)
CREAT SERPL-MCNC: 0.84 MG/DL (ref 0.6–1.3)
EOSINOPHIL # BLD AUTO: 0.04 THOUSAND/ÂΜL (ref 0–0.61)
EOSINOPHIL NFR BLD AUTO: 0 % (ref 0–6)
ERYTHROCYTE [DISTWIDTH] IN BLOOD BY AUTOMATED COUNT: 12.4 % (ref 11.6–15.1)
GFR SERPL CREATININE-BSD FRML MDRD: 108 ML/MIN/1.73SQ M
GLUCOSE SERPL-MCNC: 110 MG/DL (ref 65–140)
HCT VFR BLD AUTO: 39.2 % (ref 36.5–49.3)
HGB BLD-MCNC: 13.4 G/DL (ref 12–17)
IMM GRANULOCYTES # BLD AUTO: 0.26 THOUSAND/UL (ref 0–0.2)
IMM GRANULOCYTES NFR BLD AUTO: 2 % (ref 0–2)
LYMPHOCYTES # BLD AUTO: 2.79 THOUSANDS/ÂΜL (ref 0.6–4.47)
LYMPHOCYTES NFR BLD AUTO: 23 % (ref 14–44)
MCH RBC QN AUTO: 28.6 PG (ref 26.8–34.3)
MCHC RBC AUTO-ENTMCNC: 34.2 G/DL (ref 31.4–37.4)
MCV RBC AUTO: 84 FL (ref 82–98)
MONOCYTES # BLD AUTO: 0.81 THOUSAND/ÂΜL (ref 0.17–1.22)
MONOCYTES NFR BLD AUTO: 7 % (ref 4–12)
NEUTROPHILS # BLD AUTO: 8.24 THOUSANDS/ÂΜL (ref 1.85–7.62)
NEUTS SEG NFR BLD AUTO: 67 % (ref 43–75)
NRBC BLD AUTO-RTO: 0 /100 WBCS
PLATELET # BLD AUTO: 476 THOUSANDS/UL (ref 149–390)
PMV BLD AUTO: 8.7 FL (ref 8.9–12.7)
POTASSIUM SERPL-SCNC: 3.7 MMOL/L (ref 3.5–5.3)
RBC # BLD AUTO: 4.69 MILLION/UL (ref 3.88–5.62)
SODIUM SERPL-SCNC: 136 MMOL/L (ref 135–147)
WBC # BLD AUTO: 12.2 THOUSAND/UL (ref 4.31–10.16)

## 2024-12-04 PROCEDURE — 85025 COMPLETE CBC W/AUTO DIFF WBC: CPT | Performed by: INTERNAL MEDICINE

## 2024-12-04 PROCEDURE — 99232 SBSQ HOSP IP/OBS MODERATE 35: CPT | Performed by: PHYSICIAN ASSISTANT

## 2024-12-04 PROCEDURE — 80048 BASIC METABOLIC PNL TOTAL CA: CPT | Performed by: INTERNAL MEDICINE

## 2024-12-04 RX ORDER — ONDANSETRON 2 MG/ML
4 INJECTION INTRAMUSCULAR; INTRAVENOUS EVERY 6 HOURS PRN
Status: DISCONTINUED | OUTPATIENT
Start: 2024-12-04 | End: 2024-12-05 | Stop reason: HOSPADM

## 2024-12-04 RX ADMIN — CLINDAMYCIN PHOSPHATE 600 MG: 600 INJECTION, SOLUTION INTRAVENOUS at 10:26

## 2024-12-04 RX ADMIN — CLINDAMYCIN PHOSPHATE 600 MG: 600 INJECTION, SOLUTION INTRAVENOUS at 19:11

## 2024-12-04 RX ADMIN — ENOXAPARIN SODIUM 40 MG: 40 INJECTION SUBCUTANEOUS at 08:20

## 2024-12-04 RX ADMIN — CLINDAMYCIN PHOSPHATE 600 MG: 600 INJECTION, SOLUTION INTRAVENOUS at 01:15

## 2024-12-04 RX ADMIN — ONDANSETRON 4 MG: 2 INJECTION, SOLUTION INTRAMUSCULAR; INTRAVENOUS at 09:59

## 2024-12-04 NOTE — PLAN OF CARE
Problem: PAIN - ADULT  Goal: Verbalizes/displays adequate comfort level or baseline comfort level  Description: Interventions:  - Encourage patient to monitor pain and request assistance  - Assess pain using appropriate pain scale  - Administer analgesics based on type and severity of pain and evaluate response  - Implement non-pharmacological measures as appropriate and evaluate response  - Consider cultural and social influences on pain and pain management  - Notify physician/advanced practitioner if interventions unsuccessful or patient reports new pain  12/4/2024 0707 by Ganga Dawkins RN  Outcome: Progressing  12/4/2024 0707 by Ganga Dawkins RN  Outcome: Progressing

## 2024-12-04 NOTE — PROGRESS NOTES
"Progress Note - ENT   Name: Rossy Driver 39 y.o. male I MRN: [id number]  Unit/Bed#: -01 I Date of Admission: 12/3/2024   Date of Service: 12/4/2024 I Hospital Day: 0     Subjective: 40 yo male with large, right PTA, previously treated conservatively with Unasyn, Augmentin, course of steroids by OSH.     S/p incision and drainage in ED on 12/3.   Doing well. No acute events overnight. Afebrile, AVSS  Reports doing okay with PO intake    Objective:   /84 (BP Location: Right arm)   Pulse 100   Temp 99 °F (37.2 °C) (Oral)   Resp 18   Ht 5' 10\" (1.778 m)   Wt 81.6 kg (180 lb)   SpO2 98%   BMI 25.83 kg/m²     Physical Exam   Gen: NAD  Voice: hot potato voice  HEENT: right soft palate with mild-moderate erythema, mildly tender, mild fluctuance/compressibility but overall improved from yesterday.   Neck: unremarkable  Neuro: Grossly intact  Lungs: Breathing easily. No stertor or stridor  CV: Good distal perfusion    WBC 12.2 (15.97)  Wound culture: 4+ Gram variable rods, 2+ Gram + cocci in pairs and chains  Anaerobic culture: pending    Assessment/Plan: Right PTA measuring approximately 2.9 x 3.3 x 5.5 cm previously treated with Unasyn, Augmentin, now s/p bedside drainage in the ED on 12/3 on clindamycin - Doing well.   - Remain inpatient for additional day for continued observation, anticipate likely discharge tomorrow  - ENT will reassess tomorrow AM for possible 2nd I&D  - Continue IV antibiotics  - Follow up cultures collected  - Rest of care per primary team    Jo Ann Mcgee M.D.  PGY-1  Otolaryngology, Head and Neck Surgery  Please contact ENT Resident role on [employer] secure chat with any questions or concerns.      "

## 2024-12-04 NOTE — PLAN OF CARE
Problem: PAIN - ADULT  Goal: Verbalizes/displays adequate comfort level or baseline comfort level  Description: Interventions:  - Encourage patient to monitor pain and request assistance  - Assess pain using appropriate pain scale  - Administer analgesics based on type and severity of pain and evaluate response  - Implement non-pharmacological measures as appropriate and evaluate response  - Consider cultural and social influences on pain and pain management  - Notify physician/advanced practitioner if interventions unsuccessful or patient reports new pain  Outcome: Progressing     Problem: INFECTION - ADULT  Goal: Absence or prevention of progression during hospitalization  Description: INTERVENTIONS:  - Assess and monitor for signs and symptoms of infection  - Monitor lab/diagnostic results  - Monitor all insertion sites, i.e. indwelling lines, tubes, and drains  - Monitor endotracheal if appropriate and nasal secretions for changes in amount and color  - [place] appropriate cooling/warming therapies per order  - Administer medications as ordered  - Instruct and encourage patient and family to use good hand hygiene technique  - Identify and instruct in appropriate isolation precautions for identified infection/condition  Outcome: Progressing  Goal: Absence of fever/infection during neutropenic period  Description: INTERVENTIONS:  - Monitor WBC    Outcome: Progressing     Problem: SAFETY ADULT  Goal: Patient will remain free of falls  Description: INTERVENTIONS:  - Educate patient/family on patient safety including physical limitations  - Instruct patient to call for assistance with activity   - Consult OT/PT to assist with strengthening/mobility   - Keep Call bell within reach  - Keep bed low and locked with side rails adjusted as appropriate  - Keep care items and personal belongings within reach  - Initiate and maintain comfort rounds  - Make Fall Risk Sign visible to staff  - Offer Toileting every 2 Hours,  in advance of need  - Initiate/Maintain bed/chair alarm as needed  - Obtain necessary fall risk management equipment: socks  - Apply yellow socks and bracelet for high fall risk patients  - Consider moving patient to room near nurses station  Outcome: Progressing  Goal: Maintain or return to baseline ADL function  Description: INTERVENTIONS:  -  Assess patient's ability to carry out ADLs; assess patient's baseline for ADL function and identify physical deficits which impact ability to perform ADLs (bathing, care of mouth/teeth, toileting, grooming, dressing, etc.)  - Assess/evaluate cause of self-care deficits   - Assess range of motion  - Assess patient's mobility; develop plan if impaired  - Assess patient's need for assistive devices and provide as appropriate  - Encourage maximum independence but intervene and supervise when necessary  - Involve family in performance of ADLs  - Assess for home care needs following discharge   - Consider OT consult to assist with ADL evaluation and planning for discharge  - Provide patient education as appropriate  Outcome: Progressing  Goal: Maintains/Returns to pre admission functional level  Description: INTERVENTIONS:  - Perform AM-PAC 6 Click Basic Mobility/ Daily Activity assessment daily.  - Set and communicate daily mobility goal to care team and patient/family/caregiver.   - Collaborate with rehabilitation services on mobility goals if consulted  - Perform Range of Motion 3 times a day.  - Reposition patient every 2 hours.  - Dangle patient 3 times a day  - Stand patient 3 times a day  - Ambulate patient 3 times a day  - Out of bed to chair 3 times a day   - Out of bed for meals 3 times a day  - Out of bed for toileting  - Record patient progress and toleration of activity level   Outcome: Progressing     Problem: DISCHARGE PLANNING  Goal: Discharge to home or other facility with appropriate resources  Description: INTERVENTIONS:  - Identify barriers to discharge  w/patient and caregiver  - Arrange for needed discharge resources and transportation as appropriate  - Identify discharge learning needs (meds, wound care, etc.)  - Arrange for interpretive services to assist at discharge as needed  - Refer to Case Management Department for coordinating discharge planning if the patient needs post-hospital services based on physician/advanced practitioner order or complex needs related to functional status, cognitive ability, or social support system  Outcome: Progressing     Problem: Knowledge Deficit  Goal: Patient/family/caregiver demonstrates understanding of disease process, treatment plan, medications, and discharge instructions  Description: Complete learning assessment and assess knowledge base.  Interventions:  - Provide teaching at level of understanding  - Provide teaching via preferred learning methods  Outcome: Progressing

## 2024-12-04 NOTE — PROGRESS NOTES
Progress Note - Hospitalist   Name: Rossy Driver 39 y.o. male I MRN: [id number]  Unit/Bed#: [facility]-01 I Date of Admission: 12/3/2024   Date of Service: 12/4/2024 I Hospital Day: 0    Assessment & Plan  Peritonsillar abscess  Status post bedside incision and drainage done by ENT in the ER, 12/3.  Patient improving, no signs of airway compromise   ENT input appreciated   Continue IV clindamycin  Will reassess for need for repeat I&D tomorrow   Follow-up results of the cultures.  Continue current diet     VTE Pharmacologic Prophylaxis: VTE Score: 3 Moderate Risk (Score 3-4) - Pharmacological DVT Prophylaxis Ordered: enoxaparin (Lovenox).    Mobility:   Basic Mobility Inpatient Raw Score: 24  JH-HLM Goal: 8: Walk 250 feet or more  JH-HLM Achieved: 8: Walk 250 feet ot more  JH-HLM Goal achieved. Continue to encourage appropriate mobility.    Patient Centered Rounds: I performed bedside rounds with nursing staff today.   Discussions with Specialists or Other Care Team Provider: Discussed with RN, MILE    Education and Discussions with Family / Patient: Patient declined call to .     Current Length of Stay: 0 day(s)  Current Patient Status: Observation   Certification Statement: The patient will continue to require additional inpatient hospital stay due to on going IV antibiotics, possible repeat I&D tomorrow   Discharge Plan: Anticipate discharge tomorrow to home.    Code Status: Level 1 - Full Code    Subjective   Patient reports that his throat is feeling better. Not eating much but this was more so from upset stomach than painful swallowing. His abdomen is improved after Zofran. Denies fevers or chills. Denies difficulties breathing.     Objective :  Temp:  [98.1 °F (36.7 °C)-99 °F (37.2 °C)] 98.7 °F (37.1 °C)  HR:  [] 77  BP: (111-142)/(69-86) 124/86  Resp:  [17-18] 18  SpO2:  [97 %-99 %] 97 %  O2 Device: None (Room air)    Body mass index is 25.83 kg/m².     Input and Output Summary (last 24  hours):     Intake/Output Summary (Last 24 hours) at 12/4/2024 1210  Last data filed at 12/3/2024 1717  Gross per 24 hour   Intake 290 ml   Output --   Net 290 ml       Physical Exam  Constitutional:       General: He is not in acute distress.     Appearance: Normal appearance. He is normal weight. He is not ill-appearing or diaphoretic.   HENT:      Head: Normocephalic and atraumatic.      Mouth/Throat:      Mouth: Mucous membranes are moist.      Pharynx: Pharyngeal swelling and posterior oropharyngeal erythema present.   Eyes:      General: No scleral icterus.     Pupils: Pupils are equal, round, and reactive to light.   Cardiovascular:      Rate and Rhythm: Normal rate and regular rhythm.      Pulses: Normal pulses.      Heart sounds: Normal heart sounds, S1 normal and S2 normal. No murmur heard.     No systolic murmur is present.      No diastolic murmur is present.      No gallop. No S3 or S4 sounds.   Pulmonary:      Effort: Pulmonary effort is normal. No accessory muscle usage or respiratory distress.      Breath sounds: Normal breath sounds. No stridor. No decreased breath sounds, wheezing, rhonchi or rales.   Chest:      Chest wall: No tenderness.   Abdominal:      General: Bowel sounds are normal. There is no distension.      Palpations: Abdomen is soft.      Tenderness: There is no abdominal tenderness. There is no guarding.   Musculoskeletal:      Right lower leg: No edema.      Left lower leg: No edema.   Skin:     General: Skin is warm and dry.      Coloration: Skin is not jaundiced.   Neurological:      General: No focal deficit present.      Mental Status: He is alert. Mental status is at baseline.      Motor: No tremor or seizure activity.   Psychiatric:         Behavior: Behavior is cooperative.           Lines/Drains:              Lab Results: I have reviewed the following results:   Results from last 7 days   Lab Units 12/04/24  0426   WBC Thousand/uL 12.20*   HEMOGLOBIN g/dL 13.4   HEMATOCRIT %  39.2   PLATELETS Thousands/uL 476*   SEGS PCT % 67   LYMPHO PCT % 23   MONO PCT % 7   EOS PCT % 0     Results from last 7 days   Lab Units 12/04/24  0426 12/03/24  0147   SODIUM mmol/L 136 135   POTASSIUM mmol/L 3.7 3.6   CHLORIDE mmol/L 102 101   CO2 mmol/L 26 25   BUN mg/dL 14 15   CREATININE mg/dL 0.84 0.85   ANION GAP mmol/L 8 9   CALCIUM mg/dL 8.5 9.4   ALBUMIN g/dL  --  4.0   TOTAL BILIRUBIN mg/dL  --  0.63   ALK PHOS U/L  --  58   ALT U/L  --  36   AST U/L  --  17   GLUCOSE RANDOM mg/dL 110 101                       Recent Cultures (last 7 days):   Results from last 7 days   Lab Units 12/03/24  1636 12/03/24  0204 12/03/24  0147   BLOOD CULTURE   --  No Growth at 24 hrs. No Growth at 24 hrs.   GRAM STAIN RESULT  4+ Polys*  4+ Gram variable rods*  2+ Gram positive cocci in pairs and chains*  --   --    WOUND CULTURE  Culture too young- will reincubate  --   --        Imaging Results Review: No pertinent imaging studies reviewed.  Other Study Results Review: No additional pertinent studies reviewed.    Last 24 Hours Medication List:     Current Facility-Administered Medications:     acetaminophen (TYLENOL) tablet 650 mg, Q6H PRN    clindamycin in dextrose 5% (Cleocin) IVPB (premix) 600 mg 50 mL, Q8H, Last Rate: 600 mg (12/04/24 1026)    enoxaparin (LOVENOX) subcutaneous injection 40 mg, Daily    ketorolac (TORADOL) injection 15 mg, Q6H PRN    ondansetron (ZOFRAN) injection 4 mg, Q6H PRN    Administrative Statements   Today, Patient Was Seen By: Salazar Cooper PA-C  I have spent a total time of 35 minutes in caring for this patient on the day of the visit/encounter including Diagnostic results, Instructions for management, Impressions, Counseling / Coordination of care, Documenting in the medical record, Reviewing / ordering tests, medicine, procedures  , Obtaining or reviewing history  , and Communicating with other healthcare professionals .    **Please Note: This note may have been constructed using a  voice recognition system.**

## 2024-12-04 NOTE — ASSESSMENT & PLAN NOTE
Status post bedside incision and drainage done by ENT in the ER, 12/3.  Patient improving, no signs of airway compromise   ENT input appreciated   Continue IV clindamycin  Will reassess for need for repeat I&D tomorrow   Follow-up results of the cultures.  Continue current diet

## 2024-12-04 NOTE — UTILIZATION REVIEW
Initial Clinical Review    Admission: Date/Time/Statement:   Admission Orders (From admission, onward)       Ordered        12/03/24 0935  Place in Observation  Once                          Orders Placed This Encounter   Procedures    Place in Observation     Standing Status:   Standing     Number of Occurrences:   1     Level of Care:   Med Surg [16]     ED Arrival Information       Expected   12/3/2024     Arrival   12/3/2024 08:00    Acuity   Urgent              Means of arrival   Walk-In    Escorted by   Self    Service   Hospitalist    Admission type   Emergency              Arrival complaint   abscess             Chief Complaint   Patient presents with    Abscess     Pt transferred via EMS from [facility] with peritonsillar abscess. Pt c/o sore throat x12 days. Started on Augmentin Sunday but c/o worsening pain.        Initial Presentation: 39 y.o. male w/o signif PMH who presents as a transfer from [facility] ED to [facility] ED 12/3 for higher level of care . Pt initially presented to [facility] 12/3 , found to have  large R sided peritonsillar abscess as noted on CT soft tissue neck at [facility] . On 11/24, he presented outpatient for sore throat and was strep positive and started on amoxicillin. His symptoms continued to worsen, and he presented to [facility] admitted for 1 day Unasyn, d/[place]inee with Augmentin, steroids. Presented to [facility] on 12/3, given Decadron, toradol, Unasyn prior to transfer to [facility] for ENT evaluation. Pt reports difficulty w/ po intake , painful swallowing.On exam, oropharyngeal exudate and posterior oropharyngeal erythema , tonsillar enlargement, with erythema (right greater than left); exudate noted on the right tonsillar pharyngeal area. Patent pharynx/airway. Labs : WBC  15.97, 71% segmented neutrophils, 11.40 absolute neutrophils, elevated platelets of 499. Pt seen by ENT in [facility] ED and bedside I and D performed. Pt given IV analgesic, IV abx in ED.  Pt  admitted as Inpatient with peritonsillar abscess. Plan- ENT consult, IV Clindamycin. F/U cx . Pain control. Monitor for igns and symptoms of airway compromise and/or swallowing difficulties. Surgical soft diet- advance as chery.   Anticipated Length of Stay/Certification Statement: Patient will be admitted on an observation basis with an anticipated length of stay of less than 2 midnights .    ENT consult - large, right-sided PTA , measuring approximately 2.9 x 3.3 x 5.5 cm .  On  exam, mild trismus. Significant right soft palate swelling and erythema, R palatal effacement. Uvula deviated to the left. Performed bedside ncision and drainage of peritonsillar abscess with local anesthetic. Right sided peritonsillar abscess was aspirated and sent for culture . Tolerated well.     Date: 12/4  Post procedure day #1  Day 2:     No acute events overnight. Afebrile, VSS. Throat pain 2/10 this am . On exam,  right soft palate with mild-moderate erythema, mildly tender, mild fluctuance/compressibility but overall improved from yesterday. Breathing easily . WBC down to 12.2 today . Wound culture: 4+ Gram variable rods, 2+ Gram + cocci in pairs and chains . Anaerobic culture: pending.  Continue IV Clindamycin  . ENT to reassess tomorrow for possible 2nd I&D .  F/U cx .Diet advanced to reg diet .  Labs .            ED Treatment-Medication Administration from 12/03/2024 0652 to 12/03/2024 1715         Date/Time Order Dose Route Action     12/03/2024 0817 HYDROmorphone HCl (DILAUDID) injection 0.2 mg 0.2 mg Intravenous Given     12/03/2024 0836 lidocaine-epinephrine (XYLOCAINE/EPINEPHRINE) 2 %-1:100,000 injection 1 mL 1 mL Infiltration Given by Other     12/03/2024 0836 benzocaine (HURRICAINE) 20 % mucosal spray -- Mucosal Given by Other     12/03/2024 1128 clindamycin in dextrose 5% (Cleocin) IVPB (premix) 600 mg 50 mL 600 mg Intravenous New Bag            Scheduled Medications:  clindamycin, 600 mg, Intravenous, Q8H  enoxaparin, 40  mg, Subcutaneous, Daily      Continuous IV Infusions:     PRN Meds:  acetaminophen, 650 mg, Oral, Q6H PRN  ketorolac, 15 mg, Intravenous, Q6H PRN      ED Triage Vitals [12/03/24 0803]   Temperature Pulse Respirations Blood Pressure SpO2 Pain Score   98 °F (36.7 °C) 76 18 126/81 96 % 8     Weight (last 2 days)       Date/Time Weight    12/03/24 1717 81.6 (180)            Vital Signs (last 3 days)       Date/Time Temp Pulse Resp BP MAP (mmHg) SpO2 O2 Device Patient Position - Orthostatic VS Isamar Coma Scale Score Pain    12/04/24 0704 98.7 °F (37.1 °C) 77 18 124/86 101 97 % None (Room air) Lying -- 2    12/03/24 2300 99 °F (37.2 °C) 100 -- 142/84 105 98 % None (Room air) Lying -- --    12/03/24 1930 -- -- -- -- -- -- None (Room air) -- -- 2    12/03/24 1717 98.1 °F (36.7 °C) 99 18 111/81 92 99 % None (Room air) Lying 15 2    12/03/24 1600 -- 73 17 127/69 -- 97 % None (Room air) Lying -- --    12/03/24 1131 -- 98 18 118/75 92 96 % None (Room air) -- -- 2    12/03/24 1001 -- -- -- -- -- -- -- -- 15 --    12/03/24 0817 -- -- -- -- -- -- -- -- -- 8    12/03/24 0803 98 °F (36.7 °C) 76 18 126/81 -- 96 % None (Room air) Sitting -- 8              Pertinent Labs/Diagnostic Test Results:   Radiology:            Results from last 7 days   Lab Units 12/04/24  0426 12/03/24  0147   WBC Thousand/uL 12.20* 15.97*   HEMOGLOBIN g/dL 13.4 14.1   HEMATOCRIT % 39.2 42.3   PLATELETS Thousands/uL 476* 499*   TOTAL NEUT ABS Thousands/µL 8.24* 11.40*         Results from last 7 days   Lab Units 12/04/24  0426 12/03/24  0147 11/29/24  0638 11/28/24  0601   SODIUM mmol/L 136 135 139 139   POTASSIUM mmol/L 3.7 3.6 3.8 3.8   CHLORIDE mmol/L 102 101 106 105   CO2 mmol/L 26 25 27 22   ANION GAP mmol/L 8 9 6 12*   BUN mg/dL 14 15 11 10   CREATININE mg/dL 0.84 0.85 0.90 0.81   EGFR ml/min/1.73sq m 108 108 110 113   CALCIUM mg/dL 8.5 9.4 8.7 9.0   MAGNESIUM mg/dL  --  2.2  --   --      Results from last 7 days   Lab Units 12/03/24 0147  11/29/24  0638 11/28/24  0601   AST U/L 17 17 17   ALT U/L 36 14 17   ALK PHOS U/L 58 68 66   TOTAL PROTEIN g/dL 7.7 6.9 7.3   ALBUMIN g/dL 4.0 3.5 3.9   TOTAL BILIRUBIN mg/dL 0.63 0.5 0.5         Results from last 7 days   Lab Units 12/04/24  0426 12/03/24  0147 11/29/24  0638 11/28/24  0601   GLUCOSE RANDOM mg/dL 110 101 107* 125*                   Results from last 7 days   Lab Units 12/03/24  1636 12/03/24  0204 12/03/24  0147   BLOOD CULTURE   --  Received in Microbiology Lab. Culture in Progress. Received in Microbiology Lab. Culture in Progress.   GRAM STAIN RESULT  4+ Polys*  4+ Gram variable rods*  2+ Gram positive cocci in pairs and chains*  --   --                    Past Medical History:   Diagnosis Date    Allergic      Present on Admission:   Peritonsillar abscess      Admitting Diagnosis: Peritonsillar abscess [J36]  Abscess [L02.91]  Age/Sex: 39 y.o. male    Network Utilization Review Department  ATTENTION: Please call with any questions or concerns to [phone] and carefully listen to the prompts so that you are directed to the right person. All voicemails are confidential.   For Discharge needs, contact Care Management DC Support Team at [phone] opt. 2  Send all requests for admission clinical reviews, approved or denied determinations and any other requests to dedicated fax number below belonging to the campus where the patient is receiving treatment. List of dedicated fax numbers for the Facilities:  FACILITY NAME UR FAX NUMBER   ADMISSION DENIALS (Administrative/Medical Necessity) [phone]   DISCHARGE SUPPORT TEAM (NETWORK) [phone]   PARENT CHILD HEALTH (Maternity/NICU/Pediatrics) [phone]   [facility] [phone]   [facility] [phone]   [facility] [phone]   [facility] [phone]   [facility] [phone]   [facility]  [facility] [phone]   [facility] [phone]   [facility] [phone]   [facility] [phone]   [facility] [phone]   [facility] [phone]   [facility] [place] [phone]

## 2024-12-05 VITALS
HEIGHT: 70 IN | DIASTOLIC BLOOD PRESSURE: 75 MMHG | OXYGEN SATURATION: 97 % | RESPIRATION RATE: 16 BRPM | SYSTOLIC BLOOD PRESSURE: 109 MMHG | BODY MASS INDEX: 25.31 KG/M2 | HEART RATE: 71 BPM | TEMPERATURE: 98.1 F | WEIGHT: 176.8 LBS

## 2024-12-05 LAB
BACTERIA WND AEROBE CULT: ABNORMAL
BACTERIA WND AEROBE CULT: ABNORMAL
ERYTHROCYTE [DISTWIDTH] IN BLOOD BY AUTOMATED COUNT: 12.3 % (ref 11.6–15.1)
GRAM STN SPEC: ABNORMAL
HCT VFR BLD AUTO: 40.9 % (ref 36.5–49.3)
HGB BLD-MCNC: 13.5 G/DL (ref 12–17)
MCH RBC QN AUTO: 28.5 PG (ref 26.8–34.3)
MCHC RBC AUTO-ENTMCNC: 33 G/DL (ref 31.4–37.4)
MCV RBC AUTO: 86 FL (ref 82–98)
PLATELET # BLD AUTO: 486 THOUSANDS/UL (ref 149–390)
PMV BLD AUTO: 9 FL (ref 8.9–12.7)
RBC # BLD AUTO: 4.74 MILLION/UL (ref 3.88–5.62)
WBC # BLD AUTO: 11.26 THOUSAND/UL (ref 4.31–10.16)

## 2024-12-05 PROCEDURE — 99239 HOSP IP/OBS DSCHRG MGMT >30: CPT | Performed by: PHYSICIAN ASSISTANT

## 2024-12-05 PROCEDURE — 85027 COMPLETE CBC AUTOMATED: CPT | Performed by: PHYSICIAN ASSISTANT

## 2024-12-05 RX ORDER — CLINDAMYCIN HYDROCHLORIDE 300 MG/1
300 CAPSULE ORAL 4 TIMES DAILY
Qty: 32 CAPSULE | Refills: 0 | Status: SHIPPED | OUTPATIENT
Start: 2024-12-05 | End: 2024-12-13

## 2024-12-05 RX ADMIN — CLINDAMYCIN PHOSPHATE 600 MG: 600 INJECTION, SOLUTION INTRAVENOUS at 02:46

## 2024-12-05 NOTE — PROGRESS NOTES
"Progress Note - ENT   Name: Rossy Driver 39 y.o. male I MRN: [id number]  Unit/Bed#: S -01 I Date of Admission: 12/3/2024   Date of Service: 12/5/2024 I Hospital Day: 0     Subjective: 40 yo male with large, right PTA, previously treated conservatively with Unasyn, Augmentin, course of steroids by OSH.     S/p incision and drainage in ED on 12/3.   Doing well. No acute events overnight. Afebrile, AVSS    Objective:   /77   Pulse 78   Temp 98.6 °F (37 °C)   Resp 18   Ht 5' 10\" (1.778 m)   Wt 80.2 kg (176 lb 12.8 oz)   SpO2 96%   BMI 25.37 kg/m²     Physical Exam   Gen: NAD  Voice: hot potato voice  HEENT: right soft palate with mild erythema, minimal swelling, much improved overall, uvula midline  Neck: unremarkable  Neuro: Grossly intact  Lungs: Breathing easily. No stertor or stridor  CV: Good distal perfusion    WBC 11.26 (12.2)  Wound culture: 4+ Gram variable rods, 2+ Gram + cocci in pairs and chains  Anaerobic culture: pending    Assessment/Plan: Right PTA measuring approximately 2.9 x 3.3 x 5.5 cm previously treated with Unasyn, Augmentin, now s/p bedside drainage in the ED on 12/3 on clindamycin - Doing well and continuing to improve.   - Safe for discharge from ENT perspective, with oral clindamycin  - FU with ENT outpatient, information in chart  - Follow up cultures collected  - Rest of care per primary team    Jo Ann Mcgee M.D.  PGY-1  Otolaryngology, Head and Neck Surgery  Please contact ENT Resident role on Epic secure chat with any questions or concerns.      "

## 2024-12-05 NOTE — ASSESSMENT & PLAN NOTE
Status post bedside incision and drainage done by ENT in the ER, 12/3.  Patient improving, no signs of airway compromise   No repeat I&D needed  Stable for discharge   ENT input appreciated   Continue Clindamycin  -300 mg PO QID x 8 days, 10 days total   Follow up ENT

## 2024-12-05 NOTE — DISCHARGE SUMMARY
Discharge Summary - Hospitalist   Name: Rossy Driver 39 y.o. male I MRN: [id number]  Unit/Bed#: S -01 I Date of Admission: 12/3/2024   Date of Service: 12/5/2024 I Hospital Day: 0     Assessment & Plan  Peritonsillar abscess  Status post bedside incision and drainage done by ENT in the ER, 12/3.  Patient improving, no signs of airway compromise   No repeat I&D needed  Stable for discharge   ENT input appreciated   Continue Clindamycin  -300 mg PO QID x 8 days, 10 days total   Follow up ENT     Medical Problems       Resolved Problems  Date Reviewed: 12/5/2024   None       Discharging Physician / Practitioner: Salazar Cooper PA-C  PCP: Josh Guevara MD  Admission Date:   Admission Orders (From admission, onward)       Ordered        12/03/24 0935  Place in Observation  Once                          Discharge Date: 12/05/24    Consultations During Hospital Stay:  ENT    Procedures Performed:   CT Soft Tissue Neck  Bedside I&D    Significant Findings / Test Results:   CT Soft Tissue Neck: Prominence of the right tonsillar pillar with a heterogenous rim-enhancing fluid collection in the right tonsillar region measuring approximately 2.9 x 3.3 x 5.5 cm in size with associated narrowing of the pharynx in this region, findings taken together are highly suspicious for right tonsillar abscess.   Bedside I&D    Incidental Findings:   None     Test Results Pending at Discharge (will require follow up):   Wound, Anaerobic cultures      Outpatient Tests Requested:  None    Complications:  None    Reason for Admission: Peritonsillar Abscess    Hospital Course:   Rossy Driver is a 39 y.o. male patient who originally presented to the hospital on 12/3/2024 due to sore throat. CT soft tissue neck was done showing right peritonsillar abscess. ENT was consulted and bedside I&D was done in the ED. Patient was admitted and IV Clindamycin was continued and ENT followed for serial exams. His blood cultures were negative.  "He was able to be discharged home after ENT felt there was no need for 2nd I&D and he will complete course of PO Clindamycin at home. He was referred to ENT for further follow up.     Hospital Course: No notes on file      Please see above list of diagnoses and related plan for additional information.     Condition at Discharge: stable    Discharge Day Visit / Exam:   Subjective:  Patient reports feeling better. Eating and drinking well. Denies fevers or chills.   Vitals: Blood Pressure: 109/75 (12/05/24 0755)  Pulse: 71 (12/05/24 0755)  Temperature: 98.1 °F (36.7 °C) (12/05/24 0755)  Temp Source: Oral (12/04/24 0704)  Respirations: 16 (12/05/24 0755)  Height: 5' 10\" (177.8 cm) (12/03/24 1717)  Weight - Scale: 80.2 kg (176 lb 12.8 oz) (12/04/24 1448)  SpO2: 97 % (12/05/24 0755)  Physical Exam  Vitals and nursing note reviewed.   Constitutional:       General: He is not in acute distress.     Appearance: Normal appearance. He is normal weight. He is not ill-appearing or diaphoretic.   HENT:      Head: Normocephalic and atraumatic.      Mouth/Throat:      Mouth: Mucous membranes are moist.   Eyes:      General: No scleral icterus.     Pupils: Pupils are equal, round, and reactive to light.   Cardiovascular:      Rate and Rhythm: Normal rate and regular rhythm.      Pulses: Normal pulses.      Heart sounds: Normal heart sounds, S1 normal and S2 normal. No murmur heard.     No systolic murmur is present.      No diastolic murmur is present.      No gallop. No S3 or S4 sounds.   Pulmonary:      Effort: Pulmonary effort is normal. No accessory muscle usage or respiratory distress.      Breath sounds: Normal breath sounds. No stridor. No decreased breath sounds, wheezing, rhonchi or rales.   Chest:      Chest wall: No tenderness.   Abdominal:      General: Bowel sounds are normal. There is no distension.      Palpations: Abdomen is soft.      Tenderness: There is no abdominal tenderness. There is no guarding. "   Musculoskeletal:      Right lower leg: No edema.      Left lower leg: No edema.   Skin:     General: Skin is warm and dry.      Coloration: Skin is not jaundiced.   Neurological:      General: No focal deficit present.      Mental Status: He is alert. Mental status is at baseline.      Motor: No tremor or seizure activity.   Psychiatric:         Behavior: Behavior is cooperative.          Discussion with Family: Patient declined call to .     Discharge instructions/Information to patient and family:   See after visit summary for information provided to patient and family.      Provisions for Follow-Up Care:  See after visit summary for information related to follow-up care and any pertinent home health orders.      Mobility at time of Discharge:   Basic Mobility Inpatient Raw Score: 24  JH-HLM Goal: 8: Walk 250 feet or more  JH-HLM Achieved: 8: Walk 250 feet ot more  HLM Goal achieved. Continue to encourage appropriate mobility.     Disposition:   Home    Planned Readmission: None    Discharge Medications:  See after visit summary for reconciled discharge medications provided to patient and/or family.      Administrative Statements   Discharge Statement:  I have spent a total time of 45 minutes in caring for this patient on the day of the visit/encounter. >30 minutes of time was spent on: Diagnostic results, Instructions for management, Impressions, Counseling / Coordination of care, Documenting in the medical record, Reviewing / ordering tests, medicine, procedures  , and Communicating with other healthcare professionals .    **Please Note: This note may have been constructed using a voice recognition system**

## 2024-12-06 LAB
BACTERIA SPEC ANAEROBE CULT: ABNORMAL
BACTERIA SPEC ANAEROBE CULT: ABNORMAL

## 2024-12-06 NOTE — UTILIZATION REVIEW
NOTIFICATION OF ADMISSION DISCHARGE   This is a Notification of Discharge from [facility]. Please be advised that this patient has been discharge from our facility. Below you will find the admission and discharge date and time including the patient’s disposition.   UTILIZATION REVIEW CONTACT:  Georgina Feliciano  Utilization   Network Utilization Review Department  Phone: [phone] x carefully listen to the prompts. All voicemails are confidential.  Email: [email or web address]     ADMISSION INFORMATION  PRESENTATION DATE: 12/3/2024  8:00 AM  OBERVATION ADMISSION DATE: 12/03/2024 0935  INPATIENT ADMISSION DATE: N/A N/A   DISCHARGE DATE: 12/5/2024 11:34 AM   DISPOSITION:Home/Self Care    Network Utilization Review Department  ATTENTION: Please call with any questions or concerns to [phone] and carefully listen to the prompts so that you are directed to the right person. All voicemails are confidential.   For Discharge needs, contact Care Management DC Support Team at [phone] opt. [phone]  Send all requests for admission clinical reviews, approved or denied determinations and any other requests to dedicated fax number below belonging to the campus where the patient is receiving treatment. List of dedicated fax numbers for the Facilities:  FACILITY NAME UR FAX NUMBER   ADMISSION DENIALS (Administrative/Medical Necessity) [phone]   DISCHARGE SUPPORT TEAM ([facility]) [phone]   PARENT CHILD HEALTH (Maternity/NICU/Pediatrics) [phone]   [facility] [phone]   [facility] [phone]   [facility] [phone]   [facility] [phone]   [facility] [phone]   [facility] [phone]   [facility] [phone]   [facility] [phone]    [facility] [phone]   [facility] [phone]   [facility] [phone]   [facility] [phone]

## 2024-12-08 LAB
BACTERIA BLD CULT: NORMAL
BACTERIA BLD CULT: NORMAL

## 2025-02-07 ENCOUNTER — TELEPHONE (OUTPATIENT)
Dept: FAMILY MEDICINE CLINIC | Facility: CLINIC | Age: 40
End: 2025-02-07

## 2025-02-07 NOTE — TELEPHONE ENCOUNTER
spoke to wife - advised to update PCP with [facility] prior to apt     Also sent MYC msg to patient